# Patient Record
Sex: FEMALE
[De-identification: names, ages, dates, MRNs, and addresses within clinical notes are randomized per-mention and may not be internally consistent; named-entity substitution may affect disease eponyms.]

---

## 2024-07-05 ENCOUNTER — TRANSCRIPTION ENCOUNTER (OUTPATIENT)
Age: 34
End: 2024-07-05

## 2024-07-05 ENCOUNTER — APPOINTMENT (OUTPATIENT)
Dept: DERMATOLOGY | Facility: CLINIC | Age: 34
End: 2024-07-05
Payer: COMMERCIAL

## 2024-07-05 DIAGNOSIS — B07.9 VIRAL WART, UNSPECIFIED: ICD-10-CM

## 2024-07-05 DIAGNOSIS — L82.1 OTHER SEBORRHEIC KERATOSIS: ICD-10-CM

## 2024-07-05 PROBLEM — Z00.00 ENCOUNTER FOR PREVENTIVE HEALTH EXAMINATION: Status: ACTIVE | Noted: 2024-07-05

## 2024-07-05 PROCEDURE — 99203 OFFICE O/P NEW LOW 30 MIN: CPT | Mod: 25

## 2024-07-05 PROCEDURE — 17110 DESTRUCTION B9 LES UP TO 14: CPT

## 2024-08-09 ENCOUNTER — APPOINTMENT (OUTPATIENT)
Dept: DERMATOLOGY | Facility: CLINIC | Age: 34
End: 2024-08-09

## 2024-08-09 PROCEDURE — 17110 DESTRUCTION B9 LES UP TO 14: CPT

## 2024-08-11 NOTE — HISTORY OF PRESENT ILLNESS
[FreeTextEntry1] : RP wart [de-identified] : RP wart on third middle finger Present many years, has had treatments in the past S/p cryo last visit, also using sal acid at home though not daily  Bumps on right breast, new over last few months  SH: Has baby daughter, breastfeeding

## 2024-08-11 NOTE — ASSESSMENT
[FreeTextEntry1] : #VV R middle finger cluster The risks/benefits/alternatives of cryo-destruction was explained to the patient which, include but are not limited to redness, swelling, pain, blistering, scar, discoloration of skin, and recurrence. The patient expressed understanding of these risks and agreed to the procedure. 1 lesions treated with 2 cycle of LN2. The procedure was well tolerated, without complication. We have discussed related skin care. -In 3-5 days, can re-start OTC sal acid. As breastfeeding, to cover area treated with sal acid to prevent infant ingestion  RTC 4-6 weeks

## 2024-08-11 NOTE — HISTORY OF PRESENT ILLNESS
[FreeTextEntry1] : RP wart [de-identified] : RP wart on third middle finger Present many years, has had treatments in the past S/p cryo last visit, also using sal acid at home though not daily  Bumps on right breast, new over last few months  SH: Has baby daughter, breastfeeding

## 2024-09-27 ENCOUNTER — APPOINTMENT (OUTPATIENT)
Dept: DERMATOLOGY | Facility: CLINIC | Age: 34
End: 2024-09-27
Payer: COMMERCIAL

## 2024-09-27 VITALS — WEIGHT: 125.66 LBS | BODY MASS INDEX: 20.94 KG/M2 | HEIGHT: 65 IN

## 2024-09-27 DIAGNOSIS — D23.9 OTHER BENIGN NEOPLASM OF SKIN, UNSPECIFIED: ICD-10-CM

## 2024-09-27 DIAGNOSIS — R20.2 PARESTHESIA OF SKIN: ICD-10-CM

## 2024-09-27 DIAGNOSIS — B07.9 VIRAL WART, UNSPECIFIED: ICD-10-CM

## 2024-09-27 PROCEDURE — 99213 OFFICE O/P EST LOW 20 MIN: CPT | Mod: 25

## 2024-09-27 PROCEDURE — 17110 DESTRUCTION B9 LES UP TO 14: CPT

## 2024-09-27 NOTE — HISTORY OF PRESENT ILLNESS
[FreeTextEntry1] : RP wart [de-identified] : RP wart on third middle finger Present many years, has had treatments in the past S/p cryo x2, also using sal acid at home though not daily. Not sure if helping  Also with two larger moles on neck- present from childhood without any significant change but would like evaluated. Daughter also pulls at them, concerned about her ripping them off  Itchy spot comes and goes left middle back, right anterior thigh  SH: Has baby daughter, breastfeeding. Going back to work in a month and  just fell through

## 2024-09-27 NOTE — HISTORY OF PRESENT ILLNESS
[FreeTextEntry1] : RP wart [de-identified] : RP wart on third middle finger Present many years, has had treatments in the past S/p cryo x2, also using sal acid at home though not daily. Not sure if helping  Also with two larger moles on neck- present from childhood without any significant change but would like evaluated. Daughter also pulls at them, concerned about her ripping them off  Itchy spot comes and goes left middle back, right anterior thigh  SH: Has baby daughter, breastfeeding. Going back to work in a month and  just fell through

## 2024-09-27 NOTE — ASSESSMENT
[FreeTextEntry1] : #VV R middle finger cluster The risks/benefits/alternatives of cryo-destruction was explained to the patient which, include but are not limited to redness, swelling, pain, blistering, scar, discoloration of skin, and recurrence. The patient expressed understanding of these risks and agreed to the procedure. 1 lesions treated with 2 cycle of LN2. The procedure was well tolerated, without complication. We have discussed related skin care. -In 3-5 days, can re-start OTC sal acid. As breastfeeding, to cover area treated with sal acid to prevent infant ingestion  #Notalgia/meralgia paresthetica -Disc treatment options including PT, topical steroids, gabapentin -As tolerable, pt opts to hold off on treatment for now and see how progresses  #IDN b/l neck -Reassurance about benign appearance -RTC for any significant change -Can consider shave removal if irritated, pt to consider  RTC 4-6 weeks

## 2024-09-27 NOTE — PHYSICAL EXAM
[FreeTextEntry3] : Clustered verrucous papules R middle finger Faint hyperpigmented patch L mid-back Right anterior thigh clear Two uniform brown fleshy papules b/l neck

## 2024-10-21 ENCOUNTER — NON-APPOINTMENT (OUTPATIENT)
Age: 34
End: 2024-10-21

## 2024-10-21 ENCOUNTER — APPOINTMENT (OUTPATIENT)
Dept: DERMATOLOGY | Facility: CLINIC | Age: 34
End: 2024-10-21
Payer: COMMERCIAL

## 2024-10-21 DIAGNOSIS — B07.9 VIRAL WART, UNSPECIFIED: ICD-10-CM

## 2024-10-21 PROCEDURE — 17110 DESTRUCTION B9 LES UP TO 14: CPT

## 2024-11-25 ENCOUNTER — APPOINTMENT (OUTPATIENT)
Dept: DERMATOLOGY | Facility: CLINIC | Age: 34
End: 2024-11-25
Payer: COMMERCIAL

## 2024-11-25 DIAGNOSIS — B07.9 VIRAL WART, UNSPECIFIED: ICD-10-CM

## 2024-11-25 PROCEDURE — 17110 DESTRUCTION B9 LES UP TO 14: CPT
